# Patient Record
Sex: FEMALE | Race: OTHER | NOT HISPANIC OR LATINO
[De-identification: names, ages, dates, MRNs, and addresses within clinical notes are randomized per-mention and may not be internally consistent; named-entity substitution may affect disease eponyms.]

---

## 2017-09-01 ENCOUNTER — APPOINTMENT (OUTPATIENT)
Dept: INTERNAL MEDICINE | Facility: CLINIC | Age: 27
End: 2017-09-01
Payer: COMMERCIAL

## 2017-09-01 PROCEDURE — ZZZZZ: CPT

## 2018-09-04 ENCOUNTER — APPOINTMENT (OUTPATIENT)
Dept: INTERNAL MEDICINE | Facility: CLINIC | Age: 28
End: 2018-09-04

## 2018-09-07 ENCOUNTER — APPOINTMENT (OUTPATIENT)
Dept: INTERNAL MEDICINE | Facility: CLINIC | Age: 28
End: 2018-09-07
Payer: COMMERCIAL

## 2018-09-07 ENCOUNTER — LABORATORY RESULT (OUTPATIENT)
Age: 28
End: 2018-09-07

## 2018-09-07 VITALS
HEART RATE: 69 BPM | BODY MASS INDEX: 28.35 KG/M2 | WEIGHT: 160 LBS | TEMPERATURE: 98.4 F | SYSTOLIC BLOOD PRESSURE: 113 MMHG | HEIGHT: 63 IN | RESPIRATION RATE: 16 BRPM | DIASTOLIC BLOOD PRESSURE: 82 MMHG | OXYGEN SATURATION: 99 %

## 2018-09-07 DIAGNOSIS — Z86.19 PERSONAL HISTORY OF OTHER INFECTIOUS AND PARASITIC DISEASES: ICD-10-CM

## 2018-09-07 DIAGNOSIS — Z78.9 OTHER SPECIFIED HEALTH STATUS: ICD-10-CM

## 2018-09-07 DIAGNOSIS — Z91.89 OTHER SPECIFIED PERSONAL RISK FACTORS, NOT ELSEWHERE CLASSIFIED: ICD-10-CM

## 2018-09-07 PROCEDURE — 99395 PREV VISIT EST AGE 18-39: CPT

## 2018-09-07 NOTE — ASSESSMENT
[FreeTextEntry1] : CPE OF 28 Y OLD FEM WITH NO SIGNIFICANT MEDICAL HX= LABS AND QUANTIFERON GOLD TB\par TDAP OFFERED

## 2018-09-08 LAB
25(OH)D3 SERPL-MCNC: 30.9 NG/ML
ALBUMIN SERPL ELPH-MCNC: 4.4 G/DL
ALP BLD-CCNC: 43 U/L
ALT SERPL-CCNC: 14 U/L
ANION GAP SERPL CALC-SCNC: 15 MMOL/L
APPEARANCE: CLEAR
AST SERPL-CCNC: 24 U/L
BASOPHILS # BLD AUTO: 0.02 K/UL
BASOPHILS NFR BLD AUTO: 0.3 %
BILIRUB SERPL-MCNC: 0.3 MG/DL
BILIRUBIN URINE: NEGATIVE
BLOOD URINE: NEGATIVE
BUN SERPL-MCNC: 10 MG/DL
CALCIUM SERPL-MCNC: 9.7 MG/DL
CHLORIDE SERPL-SCNC: 102 MMOL/L
CHOLEST SERPL-MCNC: 203 MG/DL
CHOLEST/HDLC SERPL: 2.9 RATIO
CO2 SERPL-SCNC: 23 MMOL/L
COLOR: YELLOW
CREAT SERPL-MCNC: 0.77 MG/DL
EOSINOPHIL # BLD AUTO: 0.21 K/UL
EOSINOPHIL NFR BLD AUTO: 2.7 %
GLUCOSE QUALITATIVE U: NEGATIVE MG/DL
GLUCOSE SERPL-MCNC: 63 MG/DL
HCT VFR BLD CALC: 40.1 %
HDLC SERPL-MCNC: 71 MG/DL
HGB BLD-MCNC: 13.1 G/DL
IMM GRANULOCYTES NFR BLD AUTO: 0.1 %
KETONES URINE: NEGATIVE
LDLC SERPL CALC-MCNC: 111 MG/DL
LEUKOCYTE ESTERASE URINE: NEGATIVE
LYMPHOCYTES # BLD AUTO: 2.04 K/UL
LYMPHOCYTES NFR BLD AUTO: 25.8 %
MAN DIFF?: NORMAL
MCHC RBC-ENTMCNC: 30.6 PG
MCHC RBC-ENTMCNC: 32.7 GM/DL
MCV RBC AUTO: 93.7 FL
MONOCYTES # BLD AUTO: 0.51 K/UL
MONOCYTES NFR BLD AUTO: 6.4 %
NEUTROPHILS # BLD AUTO: 5.12 K/UL
NEUTROPHILS NFR BLD AUTO: 64.7 %
NITRITE URINE: NEGATIVE
PH URINE: 5
PLATELET # BLD AUTO: 319 K/UL
POTASSIUM SERPL-SCNC: 5 MMOL/L
PROT SERPL-MCNC: 7.3 G/DL
PROTEIN URINE: NEGATIVE MG/DL
RBC # BLD: 4.28 M/UL
RBC # FLD: 13.5 %
SODIUM SERPL-SCNC: 140 MMOL/L
SPECIFIC GRAVITY URINE: 1.01
TRIGL SERPL-MCNC: 105 MG/DL
TSH SERPL-ACNC: 1.28 UIU/ML
UROBILINOGEN URINE: NEGATIVE MG/DL
WBC # FLD AUTO: 7.91 K/UL

## 2018-09-09 LAB
M TB IFN-G BLD-IMP: NEGATIVE
QUANTIFERON TB PLUS MITOGEN MINUS NIL: 5.78 IU/ML
QUANTIFERON TB PLUS NIL: 0.03 IU/ML
QUANTIFERON TB PLUS TB1 MINUS NIL: 0.02 IU/ML
QUANTIFERON TB PLUS TB2 MINUS NIL: 0 IU/ML

## 2018-09-14 ENCOUNTER — APPOINTMENT (OUTPATIENT)
Dept: INTERNAL MEDICINE | Facility: CLINIC | Age: 28
End: 2018-09-14

## 2021-02-15 ENCOUNTER — RESULT REVIEW (OUTPATIENT)
Age: 31
End: 2021-02-15

## 2021-06-25 ENCOUNTER — TRANSCRIPTION ENCOUNTER (OUTPATIENT)
Age: 31
End: 2021-06-25

## 2021-06-25 ENCOUNTER — APPOINTMENT (OUTPATIENT)
Dept: INTERNAL MEDICINE | Facility: CLINIC | Age: 31
End: 2021-06-25
Payer: COMMERCIAL

## 2021-06-25 VITALS
BODY MASS INDEX: 30.12 KG/M2 | SYSTOLIC BLOOD PRESSURE: 125 MMHG | RESPIRATION RATE: 14 BRPM | HEART RATE: 80 BPM | OXYGEN SATURATION: 98 % | TEMPERATURE: 98.1 F | WEIGHT: 170 LBS | HEIGHT: 63 IN | DIASTOLIC BLOOD PRESSURE: 86 MMHG

## 2021-06-25 DIAGNOSIS — R63.5 ABNORMAL WEIGHT GAIN: ICD-10-CM

## 2021-06-25 DIAGNOSIS — Z00.00 ENCOUNTER FOR GENERAL ADULT MEDICAL EXAMINATION W/OUT ABNORMAL FINDINGS: ICD-10-CM

## 2021-06-25 DIAGNOSIS — B37.9 CANDIDIASIS, UNSPECIFIED: ICD-10-CM

## 2021-06-25 PROCEDURE — 99395 PREV VISIT EST AGE 18-39: CPT

## 2021-06-25 PROCEDURE — 99212 OFFICE O/P EST SF 10 MIN: CPT | Mod: 25

## 2021-06-25 PROCEDURE — 99072 ADDL SUPL MATRL&STAF TM PHE: CPT

## 2021-06-25 NOTE — PHYSICAL EXAM
[No Acute Distress] : no acute distress [Well Nourished] : well nourished [Well Developed] : well developed [Well-Appearing] : well-appearing [Normal Sclera/Conjunctiva] : normal sclera/conjunctiva [PERRL] : pupils equal round and reactive to light [EOMI] : extraocular movements intact [No JVD] : no jugular venous distention [No Lymphadenopathy] : no lymphadenopathy [Supple] : supple [Thyroid Normal, No Nodules] : the thyroid was normal and there were no nodules present [No Respiratory Distress] : no respiratory distress  [No Accessory Muscle Use] : no accessory muscle use [Clear to Auscultation] : lungs were clear to auscultation bilaterally [Normal Rate] : normal rate  [Regular Rhythm] : with a regular rhythm [Normal S1, S2] : normal S1 and S2 [No Murmur] : no murmur heard [No Carotid Bruits] : no carotid bruits [No Abdominal Bruit] : a ~M bruit was not heard ~T in the abdomen [No Varicosities] : no varicosities [Pedal Pulses Present] : the pedal pulses are present [No Edema] : there was no peripheral edema [No Palpable Aorta] : no palpable aorta [No Extremity Clubbing/Cyanosis] : no extremity clubbing/cyanosis [Soft] : abdomen soft [Non Tender] : non-tender [Non-distended] : non-distended [No Masses] : no abdominal mass palpated [No HSM] : no HSM [Normal Bowel Sounds] : normal bowel sounds [Normal Posterior Cervical Nodes] : no posterior cervical lymphadenopathy [Normal Anterior Cervical Nodes] : no anterior cervical lymphadenopathy [No CVA Tenderness] : no CVA  tenderness [No Spinal Tenderness] : no spinal tenderness [No Joint Swelling] : no joint swelling [Grossly Normal Strength/Tone] : grossly normal strength/tone [No Rash] : no rash [No Focal Deficits] : no focal deficits [Coordination Grossly Intact] : coordination grossly intact [Normal Affect] : the affect was normal [Normal Insight/Judgement] : insight and judgment were intact

## 2021-06-25 NOTE — REVIEW OF SYSTEMS
[Recent Change In Weight] : ~T recent weight change [Negative] : Heme/Lymph [FreeTextEntry8] : SEE HPI

## 2021-06-25 NOTE — HISTORY OF PRESENT ILLNESS
[de-identified] : COMES FOR CPE \par ALSO CONCERN ABOUT RECURRENT VAG YEAST INFECTIONS(4 THIS LAST 12 M ) ;SEEN BY GYN WHO RECOMM DM SCREENING \par PT'S MOTHER IS PREDIABETIC \par ALREADY VACCINATED FOR COVID-19

## 2021-06-27 LAB
25(OH)D3 SERPL-MCNC: 18 NG/ML
ALBUMIN SERPL ELPH-MCNC: 4.1 G/DL
ALP BLD-CCNC: 47 U/L
ALT SERPL-CCNC: 14 U/L
ANION GAP SERPL CALC-SCNC: 9 MMOL/L
APPEARANCE: CLEAR
AST SERPL-CCNC: 14 U/L
BILIRUB SERPL-MCNC: 0.3 MG/DL
BILIRUBIN URINE: NEGATIVE
BLOOD URINE: NORMAL
BUN SERPL-MCNC: 7 MG/DL
CALCIUM SERPL-MCNC: 9.3 MG/DL
CHLORIDE SERPL-SCNC: 108 MMOL/L
CHOLEST SERPL-MCNC: 182 MG/DL
CO2 SERPL-SCNC: 24 MMOL/L
COLOR: YELLOW
CREAT SERPL-MCNC: 0.85 MG/DL
ESTIMATED AVERAGE GLUCOSE: 105 MG/DL
GLUCOSE QUALITATIVE U: NEGATIVE
GLUCOSE SERPL-MCNC: 90 MG/DL
HBA1C MFR BLD HPLC: 5.3 %
HDLC SERPL-MCNC: 69 MG/DL
KETONES URINE: NEGATIVE
LDLC SERPL CALC-MCNC: 96 MG/DL
LEUKOCYTE ESTERASE URINE: NEGATIVE
NITRITE URINE: NEGATIVE
NONHDLC SERPL-MCNC: 112 MG/DL
PH URINE: 7
POTASSIUM SERPL-SCNC: 4.8 MMOL/L
PROT SERPL-MCNC: 6.8 G/DL
PROTEIN URINE: NEGATIVE
SODIUM SERPL-SCNC: 140 MMOL/L
SPECIFIC GRAVITY URINE: 1.02
TRIGL SERPL-MCNC: 81 MG/DL
TSH SERPL-ACNC: 1.57 UIU/ML
UROBILINOGEN URINE: NORMAL

## 2021-07-09 LAB
BASOPHILS # BLD AUTO: 0.06 K/UL
BASOPHILS NFR BLD AUTO: 0.7 %
EOSINOPHIL # BLD AUTO: 0.22 K/UL
EOSINOPHIL NFR BLD AUTO: 2.7 %
HCT VFR BLD CALC: 41.2 %
HGB BLD-MCNC: 13.1 G/DL
IMM GRANULOCYTES NFR BLD AUTO: 0.2 %
LYMPHOCYTES # BLD AUTO: 2.23 K/UL
LYMPHOCYTES NFR BLD AUTO: 27.4 %
MAN DIFF?: NORMAL
MCHC RBC-ENTMCNC: 30.3 PG
MCHC RBC-ENTMCNC: 31.8 GM/DL
MCV RBC AUTO: 95.2 FL
MONOCYTES # BLD AUTO: 0.53 K/UL
MONOCYTES NFR BLD AUTO: 6.5 %
NEUTROPHILS # BLD AUTO: 5.09 K/UL
NEUTROPHILS NFR BLD AUTO: 62.5 %
PLATELET # BLD AUTO: 310 K/UL
RBC # BLD: 4.33 M/UL
RBC # FLD: 12.6 %
WBC # FLD AUTO: 8.15 K/UL

## 2022-02-21 ENCOUNTER — RESULT REVIEW (OUTPATIENT)
Age: 32
End: 2022-02-21

## 2022-04-19 ENCOUNTER — APPOINTMENT (OUTPATIENT)
Dept: SURGERY | Facility: CLINIC | Age: 32
End: 2022-04-19
Payer: COMMERCIAL

## 2022-04-19 VITALS
WEIGHT: 175 LBS | SYSTOLIC BLOOD PRESSURE: 101 MMHG | OXYGEN SATURATION: 97 % | BODY MASS INDEX: 31.01 KG/M2 | DIASTOLIC BLOOD PRESSURE: 69 MMHG | HEIGHT: 63 IN | HEART RATE: 82 BPM

## 2022-04-19 VITALS — TEMPERATURE: 90.5 F

## 2022-04-19 DIAGNOSIS — Z82.49 FAMILY HISTORY OF ISCHEMIC HEART DISEASE AND OTHER DISEASES OF THE CIRCULATORY SYSTEM: ICD-10-CM

## 2022-04-19 DIAGNOSIS — Z01.818 ENCOUNTER FOR OTHER PREPROCEDURAL EXAMINATION: ICD-10-CM

## 2022-04-19 DIAGNOSIS — A63.0 ANOGENITAL (VENEREAL) WARTS: ICD-10-CM

## 2022-04-19 DIAGNOSIS — Z80.3 FAMILY HISTORY OF MALIGNANT NEOPLASM OF BREAST: ICD-10-CM

## 2022-04-19 DIAGNOSIS — E78.00 PURE HYPERCHOLESTEROLEMIA, UNSPECIFIED: ICD-10-CM

## 2022-04-19 PROCEDURE — 99203 OFFICE O/P NEW LOW 30 MIN: CPT | Mod: 25

## 2022-04-19 PROCEDURE — 46600 DIAGNOSTIC ANOSCOPY SPX: CPT

## 2022-04-27 PROBLEM — Z82.49 FAMILY HISTORY OF MYOCARDIAL INFARCTION: Status: ACTIVE | Noted: 2022-04-19

## 2022-04-27 PROBLEM — A63.0 ANAL WART: Status: ACTIVE | Noted: 2022-04-27

## 2022-04-27 PROBLEM — Z82.49 FAMILY HISTORY OF HYPERTENSION: Status: ACTIVE | Noted: 2022-04-19

## 2022-04-27 PROBLEM — Z80.3 FAMILY HISTORY OF MALIGNANT NEOPLASM OF BREAST: Status: ACTIVE | Noted: 2022-04-19

## 2022-04-27 PROBLEM — Z82.49 FAMILY HISTORY OF CARDIAC DISORDER: Status: ACTIVE | Noted: 2022-04-19

## 2022-04-27 PROBLEM — E78.00 HIGH CHOLESTEROL: Status: ACTIVE | Noted: 2022-04-19

## 2022-04-27 NOTE — PROCEDURE
[FreeTextEntry1] : Anoscopy - performed with small lighted disposable anoscope, normal rectal mucosa, no obvious anal warts

## 2022-04-27 NOTE — ASSESSMENT
[FreeTextEntry1] : 31y.o. F w/ a history of genital warts presenting for concern for anorectal warts.

## 2022-04-27 NOTE — PHYSICAL EXAM
[None] : no [Normal Breath Sounds] : Normal breath sounds [Normal Heart Sounds] : normal heart sounds [Normal Rate and Rhythm] : normal rate and rhythm [Alert] : alert [Oriented to Person] : oriented to person [Oriented to Place] : oriented to place [Oriented to Time] : oriented to time [Excoriation] : no perianal excoriation [JVD] : no jugular venous distention  [Gross Blood] : no gross blood [Wheezing] : no wheezing was heard [de-identified] : soft, non-distended, non-tender to palpation, no incisions [de-identified] : small LA, LP and RA skin tags (not obviously condylomas on exam but could also be), what appears to be a mole on the right posterior aspect, internal exam below [de-identified] : awake, alert, in NAD [de-identified] : normocephalic, atraumatic, EOMI, nl conjunctiva [de-identified] : b/l chest rise, EWOB on RA [de-identified] : deferred [de-identified] : normal strength [de-identified] : normal mood and affect [de-identified] : perianal skin above

## 2022-04-27 NOTE — REVIEW OF SYSTEMS
[As Noted in HPI] : as noted in HPI [Negative] : Neurological [Fever] : no fever [Chills] : no chills [Recent Weight Loss (___ Lbs)] : no recent weight loss [Eye Pain] : no eye pain [Earache] : no earache [Chest Pain] : no chest pain [Palpitations] : no palpitations [Shortness Of Breath] : no shortness of breath [Abdominal Pain] : no abdominal pain [Vomiting] : no vomiting [Constipation] : no constipation [Dysuria] : no dysuria

## 2022-04-27 NOTE — HISTORY OF PRESENT ILLNESS
[FreeTextEntry1] : Ms. Norah York is a 31y.o. F w/ a history of elevated triglycerides presenting for anorectal warts. She follows with Dr. Fermin Cheung who has burned off other genital warts for the pt but mentioned to the pt some anorectal ones that she referred her to me for. Pt feels like she has had anal warts for years but the perineal one that has just been since 2/14/22. She denies any bleeding or pain. She has daily BMs which are soft without straining. She has never had a colonoscopy. Her last pap smear was in February and it was normal this time (prior HPV).

## 2022-05-04 ENCOUNTER — OUTPATIENT (OUTPATIENT)
Dept: OUTPATIENT SERVICES | Facility: HOSPITAL | Age: 32
LOS: 1 days | End: 2022-05-04
Payer: COMMERCIAL

## 2022-05-04 VITALS
RESPIRATION RATE: 18 BRPM | DIASTOLIC BLOOD PRESSURE: 81 MMHG | HEIGHT: 63 IN | WEIGHT: 175.05 LBS | SYSTOLIC BLOOD PRESSURE: 122 MMHG | TEMPERATURE: 98 F | HEART RATE: 79 BPM | OXYGEN SATURATION: 99 %

## 2022-05-04 DIAGNOSIS — Z01.812 ENCOUNTER FOR PREPROCEDURAL LABORATORY EXAMINATION: ICD-10-CM

## 2022-05-04 DIAGNOSIS — A63.0 ANOGENITAL (VENEREAL) WARTS: ICD-10-CM

## 2022-05-04 DIAGNOSIS — Z98.890 OTHER SPECIFIED POSTPROCEDURAL STATES: Chronic | ICD-10-CM

## 2022-05-04 LAB
ALBUMIN SERPL ELPH-MCNC: 3.6 G/DL — SIGNIFICANT CHANGE UP (ref 3.5–5)
ALP SERPL-CCNC: 58 U/L — SIGNIFICANT CHANGE UP (ref 40–120)
ALT FLD-CCNC: 21 U/L DA — SIGNIFICANT CHANGE UP (ref 10–60)
ANION GAP SERPL CALC-SCNC: 5 MMOL/L — SIGNIFICANT CHANGE UP (ref 5–17)
AST SERPL-CCNC: 11 U/L — SIGNIFICANT CHANGE UP (ref 10–40)
BILIRUB SERPL-MCNC: 0.5 MG/DL — SIGNIFICANT CHANGE UP (ref 0.2–1.2)
BUN SERPL-MCNC: 13 MG/DL — SIGNIFICANT CHANGE UP (ref 7–18)
CALCIUM SERPL-MCNC: 9.8 MG/DL — SIGNIFICANT CHANGE UP (ref 8.4–10.5)
CHLORIDE SERPL-SCNC: 108 MMOL/L — SIGNIFICANT CHANGE UP (ref 96–108)
CO2 SERPL-SCNC: 27 MMOL/L — SIGNIFICANT CHANGE UP (ref 22–31)
CREAT SERPL-MCNC: 0.84 MG/DL — SIGNIFICANT CHANGE UP (ref 0.5–1.3)
EGFR: 96 ML/MIN/1.73M2 — SIGNIFICANT CHANGE UP
GLUCOSE SERPL-MCNC: 87 MG/DL — SIGNIFICANT CHANGE UP (ref 70–99)
HCT VFR BLD CALC: 40.8 % — SIGNIFICANT CHANGE UP (ref 34.5–45)
HGB BLD-MCNC: 13.2 G/DL — SIGNIFICANT CHANGE UP (ref 11.5–15.5)
MCHC RBC-ENTMCNC: 30 PG — SIGNIFICANT CHANGE UP (ref 27–34)
MCHC RBC-ENTMCNC: 32.4 GM/DL — SIGNIFICANT CHANGE UP (ref 32–36)
MCV RBC AUTO: 92.7 FL — SIGNIFICANT CHANGE UP (ref 80–100)
NRBC # BLD: 0 /100 WBCS — SIGNIFICANT CHANGE UP (ref 0–0)
PLATELET # BLD AUTO: 275 K/UL — SIGNIFICANT CHANGE UP (ref 150–400)
POTASSIUM SERPL-MCNC: 4.2 MMOL/L — SIGNIFICANT CHANGE UP (ref 3.5–5.3)
POTASSIUM SERPL-SCNC: 4.2 MMOL/L — SIGNIFICANT CHANGE UP (ref 3.5–5.3)
PROT SERPL-MCNC: 7.5 G/DL — SIGNIFICANT CHANGE UP (ref 6–8.3)
RBC # BLD: 4.4 M/UL — SIGNIFICANT CHANGE UP (ref 3.8–5.2)
RBC # FLD: 12.6 % — SIGNIFICANT CHANGE UP (ref 10.3–14.5)
SODIUM SERPL-SCNC: 140 MMOL/L — SIGNIFICANT CHANGE UP (ref 135–145)
WBC # BLD: 9.85 K/UL — SIGNIFICANT CHANGE UP (ref 3.8–10.5)
WBC # FLD AUTO: 9.85 K/UL — SIGNIFICANT CHANGE UP (ref 3.8–10.5)

## 2022-05-04 PROCEDURE — 36415 COLL VENOUS BLD VENIPUNCTURE: CPT

## 2022-05-04 PROCEDURE — 80053 COMPREHEN METABOLIC PANEL: CPT

## 2022-05-04 PROCEDURE — G0463: CPT

## 2022-05-04 PROCEDURE — 85027 COMPLETE CBC AUTOMATED: CPT

## 2022-05-04 NOTE — H&P PST ADULT - PROBLEM SELECTOR PLAN 1
Patient is scheduled for examination under anesthesia, excision of anal skin on 5/9/2022  Written and oral preoperative instructions given to patient with understanding verbalized.   Instructions given to include using 4% chlorhexidine wash as directed starting 3days before day of surgery (inclusive of day of surgery)  Maintaining NPO status post midnight day before surgery  Stopping aspirin, NSAIDs, herbs, vitamins 7days before surgery   Patient is to expect a phone call day before surgery between the hours of 430- 630pm giving arrival time for surgery day.    Patient today with STOP bang score of 0, Low risk for GRACIE  Preoperative labs drawn today, results pending

## 2022-05-04 NOTE — H&P PST ADULT - HISTORY OF PRESENT ILLNESS
31year old obese female with no significant pmhx presents with c/o anal skin tag found on 2/14/2022. Patient denies any pain or bleeding and is here today for presurgical testing for scheduled examination under anesthesia, excision of anal skin tag on 5/9/2022

## 2022-05-04 NOTE — H&P PST ADULT - NS ATTEND AMEND GEN_ALL_CORE FT
OR today for EUA, excision of anal skin tags and mole  prone jackknife  MAC for anesthesia  0.5% marcaine and exparel 1:1    Rsos Howard MD  Attending Physician

## 2022-05-08 ENCOUNTER — TRANSCRIPTION ENCOUNTER (OUTPATIENT)
Age: 32
End: 2022-05-08

## 2022-05-09 ENCOUNTER — RESULT REVIEW (OUTPATIENT)
Age: 32
End: 2022-05-09

## 2022-05-09 ENCOUNTER — OUTPATIENT (OUTPATIENT)
Dept: OUTPATIENT SERVICES | Facility: HOSPITAL | Age: 32
LOS: 1 days | End: 2022-05-09
Payer: COMMERCIAL

## 2022-05-09 ENCOUNTER — TRANSCRIPTION ENCOUNTER (OUTPATIENT)
Age: 32
End: 2022-05-09

## 2022-05-09 ENCOUNTER — APPOINTMENT (OUTPATIENT)
Dept: SURGERY | Facility: HOSPITAL | Age: 32
End: 2022-05-09
Payer: COMMERCIAL

## 2022-05-09 VITALS
HEART RATE: 85 BPM | TEMPERATURE: 98 F | HEIGHT: 63 IN | RESPIRATION RATE: 18 BRPM | SYSTOLIC BLOOD PRESSURE: 122 MMHG | OXYGEN SATURATION: 100 % | WEIGHT: 175.05 LBS | DIASTOLIC BLOOD PRESSURE: 81 MMHG

## 2022-05-09 VITALS
HEART RATE: 83 BPM | TEMPERATURE: 98 F | OXYGEN SATURATION: 100 % | RESPIRATION RATE: 16 BRPM | SYSTOLIC BLOOD PRESSURE: 110 MMHG | DIASTOLIC BLOOD PRESSURE: 70 MMHG

## 2022-05-09 DIAGNOSIS — Z98.890 OTHER SPECIFIED POSTPROCEDURAL STATES: Chronic | ICD-10-CM

## 2022-05-09 DIAGNOSIS — A63.0 ANOGENITAL (VENEREAL) WARTS: ICD-10-CM

## 2022-05-09 DIAGNOSIS — Z01.812 ENCOUNTER FOR PREPROCEDURAL LABORATORY EXAMINATION: ICD-10-CM

## 2022-05-09 LAB — HCG UR QL: NEGATIVE — SIGNIFICANT CHANGE UP

## 2022-05-09 PROCEDURE — 88305 TISSUE EXAM BY PATHOLOGIST: CPT

## 2022-05-09 PROCEDURE — 88304 TISSUE EXAM BY PATHOLOGIST: CPT | Mod: 26

## 2022-05-09 PROCEDURE — 88304 TISSUE EXAM BY PATHOLOGIST: CPT

## 2022-05-09 PROCEDURE — 81025 URINE PREGNANCY TEST: CPT

## 2022-05-09 PROCEDURE — 88305 TISSUE EXAM BY PATHOLOGIST: CPT | Mod: 26

## 2022-05-09 PROCEDURE — 46220 EXCISE ANAL EXT TAG/PAPILLA: CPT

## 2022-05-09 PROCEDURE — 11104 PUNCH BX SKIN SINGLE LESION: CPT

## 2022-05-09 DEVICE — SPONGE GELFOAM SZ 100 UNCOMPRESSED: Type: IMPLANTABLE DEVICE | Site: EXCISION OF ANAL MASS | Status: FUNCTIONAL

## 2022-05-09 DEVICE — SPONGE HSTAT SURGICEL FIBRILLAR 2X4": Type: IMPLANTABLE DEVICE | Site: EXCISION OF ANAL MASS | Status: FUNCTIONAL

## 2022-05-09 RX ORDER — FENTANYL CITRATE 50 UG/ML
25 INJECTION INTRAVENOUS
Refills: 0 | Status: DISCONTINUED | OUTPATIENT
Start: 2022-05-09 | End: 2022-05-09

## 2022-05-09 RX ORDER — SODIUM CHLORIDE 9 MG/ML
3 INJECTION INTRAMUSCULAR; INTRAVENOUS; SUBCUTANEOUS EVERY 8 HOURS
Refills: 0 | Status: DISCONTINUED | OUTPATIENT
Start: 2022-05-09 | End: 2022-05-09

## 2022-05-09 RX ORDER — OXYCODONE HYDROCHLORIDE 5 MG/1
5 TABLET ORAL EVERY 4 HOURS
Refills: 0 | Status: DISCONTINUED | OUTPATIENT
Start: 2022-05-09 | End: 2022-05-09

## 2022-05-09 RX ORDER — ACETAMINOPHEN 500 MG
1000 TABLET ORAL ONCE
Refills: 0 | Status: DISCONTINUED | OUTPATIENT
Start: 2022-05-09 | End: 2022-05-09

## 2022-05-09 RX ORDER — FENTANYL CITRATE 50 UG/ML
50 INJECTION INTRAVENOUS
Refills: 0 | Status: DISCONTINUED | OUTPATIENT
Start: 2022-05-09 | End: 2022-05-09

## 2022-05-09 RX ORDER — SODIUM CHLORIDE 9 MG/ML
1000 INJECTION, SOLUTION INTRAVENOUS
Refills: 0 | Status: DISCONTINUED | OUTPATIENT
Start: 2022-05-09 | End: 2022-05-09

## 2022-05-09 RX ORDER — OXYCODONE HYDROCHLORIDE 5 MG/1
1 TABLET ORAL
Qty: 6 | Refills: 0
Start: 2022-05-09

## 2022-05-09 RX ADMIN — SODIUM CHLORIDE 3 MILLILITER(S): 9 INJECTION INTRAMUSCULAR; INTRAVENOUS; SUBCUTANEOUS at 12:04

## 2022-05-09 NOTE — BRIEF OPERATIVE NOTE - NSICDXBRIEFPROCEDURE_GEN_ALL_CORE_FT
PROCEDURES:  Exam under anesthesia, anus 09-May-2022 13:40:09  Doron La  Excision, lesion, skin, perianal 09-May-2022 13:40:28  oDron La

## 2022-05-09 NOTE — ASU PATIENT PROFILE, ADULT - FALL HARM RISK - UNIVERSAL INTERVENTIONS
Bed in lowest position, wheels locked, appropriate side rails in place/Call bell, personal items and telephone in reach/Instruct patient to call for assistance before getting out of bed or chair/Non-slip footwear when patient is out of bed/[place] to call system/Physically safe environment - no spills, clutter or unnecessary equipment/Purposeful Proactive Rounding/Room/bathroom lighting operational, light cord in reach

## 2022-05-09 NOTE — ASU DISCHARGE PLAN (ADULT/PEDIATRIC) - CARE PROVIDER_API CALL
Ross Howard)  Surgery  [address] [facility] [address], [address]  [place] [address]  Phone: [phone]  Fax: [phone]  Follow Up Time:

## 2022-05-09 NOTE — ASU DISCHARGE PLAN (ADULT/PEDIATRIC) - DATE OF LAST VACCINATION
Diabetes Self Management Education Assessment    Learning Style  Learning style preference:  looking at diagrams, charts, or graphs, reading pamphlets, books, or articles, question and answer, talk, group discussion, or guest speakers, demonstrations or models and utilize a smart phone or internet to gather health information    Health History  Do you know what type of diabetes you have?  Yes  Does anyone in your family have diabetes? Yes  Have you had diabetes education before? Yes    Nutrition / Meal Planning  Do you eat three meals a day?  No  Do you eat at about the same time each day?  Yes  Do have diet limitations? (low salt, no dairy products, no wheat products)  No  Do you drink regular soda or fruit drinks (orange juice, fruit punch)?  No  Do you eat desserts/sweets more than once or twice a week?  No  Do you drink alcohol? No     Do you \"eat out\" or get \"carry out\" more than once per week? Yes  Do you do your own cooking? Yes  Do you do your own grocery shopping?  Yes    Activity  Do you exercise at least 30 minutes/3 or more times per week?  No  If yes, what type of activity do you do?    Is there a medical reason for limiting activity?  No    Monitoring  Do you have a blood sugar monitor? Yes  How many times per day are you testing? 1-2 times per day  Do you check your blood sugar level before driving? No  Do you have a low blood sugar more than 1 time per week? No  In the past 12 months, have you had a high or low blood sugar that required treatment help from another person or hospitalization?  No  Do you have a sharps container?  Yes    Medication  Do you miss or forget to take your pills or insulin?  No  Do you carry a current medication list or card in your wallet?  No  Do you wear diabetes identification?  Yes  Do you carry a form of fast-acting sugar with you?  Yes    Complications/Problems  Do you have numbness, tingling, sores, or pain in your hands or feet?  Yes   Do you check your feet daily  for any signs of problems?  Yes  Have you had a dilated eye exam in the past year?  No  Have you seen a dentist in the past year?  No  Have you had a change or loss in hearing?  No  Do you sleep 6-8 hours per night?  Yes  Do you have sleep apnea?  No  Do you have any sexual problems?  No    Socioeconomic  Do you live alone?  Yes  Do you feel safe in relationships at home?  Yes  Do you work outside the home?  Yes  Do you work second or third shift? No  Do money concerns keep you from:   Taking your medications as prescribed? No   Attending medical appointments? No  Within the past 12 months, have you worried whether food would run out before you had money to buy more? No   Do you have cultural or [employer] practices or beliefs that influence how you care for your diabetes?  No  Over the past two weeks have you felt little interest or pleasure in doing things? Several days  Over the past two weeks have you felt down, depressed or hopeless? No  To what degree, during the past month, have you been distressed or bothered by the following:     Feeling overwhelmed with the diagnosis of diabetes? A moderate problem   Feeling overwhelmed by the demands of living with diabetes? Not a problem    How do you get support?   I do not have a source of support    Which Topics Would You Like to Learn About?  treating high/low blood sugars and diabetes medications         21-Mar-2021

## 2022-05-10 PROBLEM — E66.9 OBESITY, UNSPECIFIED: Chronic | Status: ACTIVE | Noted: 2022-05-04

## 2022-05-10 PROBLEM — Z98.890 OTHER SPECIFIED POSTPROCEDURAL STATES: Chronic | Status: ACTIVE | Noted: 2022-05-04

## 2022-05-12 LAB — SURGICAL PATHOLOGY STUDY: SIGNIFICANT CHANGE UP

## 2022-05-31 ENCOUNTER — APPOINTMENT (OUTPATIENT)
Dept: SURGERY | Facility: CLINIC | Age: 32
End: 2022-05-31
Payer: COMMERCIAL

## 2022-05-31 VITALS
HEART RATE: 80 BPM | BODY MASS INDEX: 31.01 KG/M2 | WEIGHT: 175 LBS | OXYGEN SATURATION: 98 % | HEIGHT: 63 IN | DIASTOLIC BLOOD PRESSURE: 75 MMHG | SYSTOLIC BLOOD PRESSURE: 112 MMHG

## 2022-05-31 VITALS — TEMPERATURE: 97.5 F

## 2022-05-31 PROCEDURE — 99024 POSTOP FOLLOW-UP VISIT: CPT

## 2022-06-08 NOTE — ASSESSMENT
[FreeTextEntry1] : Ms. Norah York is a 31y.o. F had an EUA and excision of perianal skin lesions on 5/9/2022 and is here for a post-op visit.

## 2022-06-08 NOTE — REVIEW OF SYSTEMS
[As Noted in HPI] : as noted in HPI [Negative] : Gastrointestinal [Fever] : no fever [Chills] : no chills [Feeling Poorly] : not feeling poorly [Feeling Tired] : not feeling tired [Recent Weight Loss (___ Lbs)] : no recent weight loss [Eye Pain] : no eye pain [Earache] : no earache [Chest Pain] : no chest pain [Palpitations] : no palpitations [Shortness Of Breath] : no shortness of breath [Abdominal Pain] : no abdominal pain [Vomiting] : no vomiting [Constipation] : no constipation [Diarrhea] : no diarrhea [Dysuria] : no dysuria

## 2022-06-08 NOTE — PHYSICAL EXAM
[Normal Rate and Rhythm] : normal rate and rhythm [Alert] : alert [Oriented to Person] : oriented to person [Oriented to Place] : oriented to place [Oriented to Time] : oriented to time [Excoriation] : no perianal excoriation [Gross Blood] : no gross blood [JVD] : no jugular venous distention  [de-identified] : soft, non-distended, non-tender to palpation [de-identified] : excision sites healing well without any residual skin tags, no erythema or fluctuance. Internal exam deferred.  [de-identified] : awake, alert, in NAD [de-identified] : normocephalic, atraumatic, EOMI, nl conjunctiva [de-identified] : b/l chest rise, EWOB on RA [de-identified] : deferred [de-identified] : normal strength [de-identified] : perianal as above [de-identified] : normal mood and affect

## 2022-06-08 NOTE — HISTORY OF PRESENT ILLNESS
[FreeTextEntry1] : Ms. Norah York is a 31y.o. F w/ a history of HLD and warts, had an EUA and excision of perianal skin lesions on 5/9/2022 and is here for a post-op visit. She follows with Dr. Fermin Cheung who has burned off other genital warts for the pt but mentioned to the pt some anorectal ones that she referred her here for. Pt feels like she has had anal warts for years but the perineal one that has just been since 2/14/22. She denies any bleeding or pain. She has daily BMs which are soft without straining. She has never had a colonoscopy. Her last pap smear was in February and it was normal this time (prior HPV). Today patient is doing well. Patient states she feels a small pulling sensation in the perianal area. Denies fever and chills.

## 2022-10-20 ENCOUNTER — RESULT REVIEW (OUTPATIENT)
Age: 32
End: 2022-10-20

## 2023-08-18 NOTE — H&P PST ADULT - NEGATIVE OPHTHALMOLOGIC SYMPTOMS
Patient presents to the ED ambulatory from Triage. Patient states that she has had left sided facial pain from an inflamed tooth. Patient states that she has taken tylenol and placed oragel on the affected side with no relief. Patient have even and unlabored respirations and NAD. Writer will continue with plan of care. no blurred vision L/no blurred vision R/no loss of vision L/no loss of vision R
